# Patient Record
Sex: MALE | Race: WHITE | NOT HISPANIC OR LATINO | Employment: UNEMPLOYED | URBAN - NONMETROPOLITAN AREA
[De-identification: names, ages, dates, MRNs, and addresses within clinical notes are randomized per-mention and may not be internally consistent; named-entity substitution may affect disease eponyms.]

---
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
## 2022-04-14 DIAGNOSIS — F33.42 RECURRENT MAJOR DEPRESSIVE DISORDER, IN FULL REMISSION: Chronic | ICD-10-CM

## 2022-04-14 DIAGNOSIS — F41.1 GENERALIZED ANXIETY DISORDER: Chronic | ICD-10-CM

## 2022-04-14 RX ORDER — ESCITALOPRAM OXALATE 10 MG/1
10 TABLET ORAL DAILY
Qty: 30 TABLET | Refills: 11 | Status: SHIPPED | OUTPATIENT
Start: 2022-04-14

## 2022-04-20 ENCOUNTER — LAB (OUTPATIENT)
Dept: LAB | Facility: OTHER | Age: 22
End: 2022-04-20

## 2022-04-20 DIAGNOSIS — E78.2 MIXED HYPERLIPIDEMIA: Chronic | ICD-10-CM

## 2022-04-20 DIAGNOSIS — E11.9 TYPE 2 DIABETES MELLITUS WITHOUT COMPLICATION, WITHOUT LONG-TERM CURRENT USE OF INSULIN: Chronic | ICD-10-CM

## 2022-04-20 DIAGNOSIS — I10 ESSENTIAL HYPERTENSION: ICD-10-CM

## 2022-04-20 LAB
ALBUMIN SERPL-MCNC: 4.7 G/DL (ref 3.5–5)
ALBUMIN/GLOB SERPL: 1.5 G/DL (ref 1.1–1.8)
ALP SERPL-CCNC: 77 U/L (ref 38–126)
ALT SERPL W P-5'-P-CCNC: 70 U/L
ANION GAP SERPL CALCULATED.3IONS-SCNC: 9 MMOL/L (ref 5–15)
AST SERPL-CCNC: 47 U/L (ref 17–59)
BILIRUB SERPL-MCNC: 1 MG/DL (ref 0.2–1.3)
BILIRUB UR QL STRIP: ABNORMAL
BUN SERPL-MCNC: 8 MG/DL (ref 7–23)
BUN/CREAT SERPL: 12.5 (ref 7–25)
CALCIUM SPEC-SCNC: 9.8 MG/DL (ref 8.4–10.2)
CHLORIDE SERPL-SCNC: 102 MMOL/L (ref 101–112)
CHOLEST SERPL-MCNC: 177 MG/DL (ref 150–200)
CLARITY UR: CLEAR
CO2 SERPL-SCNC: 27 MMOL/L (ref 22–30)
COLOR UR: ABNORMAL
CREAT SERPL-MCNC: 0.64 MG/DL (ref 0.7–1.3)
DEPRECATED RDW RBC AUTO: 41.2 FL (ref 37–54)
EGFRCR SERPLBLD CKD-EPI 2021: 139 ML/MIN/1.73
EOSINOPHIL # BLD MANUAL: 0.4 10*3/MM3 (ref 0–0.4)
EOSINOPHIL NFR BLD MANUAL: 4 % (ref 0.3–6.2)
ERYTHROCYTE [DISTWIDTH] IN BLOOD BY AUTOMATED COUNT: 13.5 % (ref 12.3–15.4)
GLOBULIN UR ELPH-MCNC: 3.2 GM/DL (ref 2.3–3.5)
GLUCOSE SERPL-MCNC: 142 MG/DL (ref 70–99)
GLUCOSE UR STRIP-MCNC: NEGATIVE MG/DL
HBA1C MFR BLD: 6.7 % (ref 4.8–5.6)
HCT VFR BLD AUTO: 46.5 % (ref 37.5–51)
HDLC SERPL-MCNC: 32 MG/DL (ref 40–59)
HGB BLD-MCNC: 16.3 G/DL (ref 13–17.7)
HGB UR QL STRIP.AUTO: NEGATIVE
KETONES UR QL STRIP: ABNORMAL
LDLC SERPL CALC-MCNC: 99 MG/DL
LDLC/HDLC SERPL: 2.86 {RATIO} (ref 0–3.55)
LEUKOCYTE ESTERASE UR QL STRIP.AUTO: NEGATIVE
LYMPHOCYTES # BLD MANUAL: 2.1 10*3/MM3 (ref 0.7–3.1)
LYMPHOCYTES NFR BLD MANUAL: 12 % (ref 5–12)
MCH RBC QN AUTO: 29.6 PG (ref 26.6–33)
MCHC RBC AUTO-ENTMCNC: 35.1 G/DL (ref 31.5–35.7)
MCV RBC AUTO: 84.4 FL (ref 79–97)
MONOCYTES # BLD: 1.2 10*3/MM3 (ref 0.1–0.9)
NEUTROPHILS # BLD AUTO: 6.29 10*3/MM3 (ref 1.7–7)
NEUTROPHILS NFR BLD MANUAL: 63 % (ref 42.7–76)
NITRITE UR QL STRIP: NEGATIVE
PH UR STRIP.AUTO: 5.5 [PH] (ref 5.5–8)
PLATELET # BLD AUTO: 304 10*3/MM3 (ref 140–450)
PMV BLD AUTO: 10.7 FL (ref 6–12)
POTASSIUM SERPL-SCNC: 4 MMOL/L (ref 3.4–5)
PROT SERPL-MCNC: 7.9 G/DL (ref 6.3–8.6)
PROT UR QL STRIP: ABNORMAL
RBC # BLD AUTO: 5.51 10*6/MM3 (ref 4.14–5.8)
RBC MORPH BLD: NORMAL
SMALL PLATELETS BLD QL SMEAR: ADEQUATE
SODIUM SERPL-SCNC: 138 MMOL/L (ref 137–145)
SP GR UR STRIP: 1.02 (ref 1–1.03)
TRIGL SERPL-MCNC: 268 MG/DL
UROBILINOGEN UR QL STRIP: ABNORMAL
VARIANT LYMPHS NFR BLD MANUAL: 21 % (ref 19.6–45.3)
VLDLC SERPL-MCNC: 46 MG/DL (ref 5–40)
WBC MORPH BLD: NORMAL
WBC NRBC COR # BLD: 9.99 10*3/MM3 (ref 3.4–10.8)

## 2022-04-20 PROCEDURE — 80050 GENERAL HEALTH PANEL: CPT | Performed by: INTERNAL MEDICINE

## 2022-04-20 PROCEDURE — 85007 BL SMEAR W/DIFF WBC COUNT: CPT | Performed by: INTERNAL MEDICINE

## 2022-04-20 PROCEDURE — 80061 LIPID PANEL: CPT | Performed by: INTERNAL MEDICINE

## 2022-04-20 PROCEDURE — 84439 ASSAY OF FREE THYROXINE: CPT | Performed by: INTERNAL MEDICINE

## 2022-04-20 PROCEDURE — 81003 URINALYSIS AUTO W/O SCOPE: CPT | Performed by: INTERNAL MEDICINE

## 2022-04-20 PROCEDURE — 83036 HEMOGLOBIN GLYCOSYLATED A1C: CPT | Performed by: INTERNAL MEDICINE

## 2022-04-21 LAB
T4 FREE SERPL-MCNC: 1.5 NG/DL (ref 0.93–1.7)
TSH SERPL DL<=0.05 MIU/L-ACNC: 2.05 UIU/ML (ref 0.27–4.2)

## 2022-04-25 ENCOUNTER — OFFICE VISIT (OUTPATIENT)
Dept: FAMILY MEDICINE CLINIC | Facility: CLINIC | Age: 22
End: 2022-04-25

## 2022-04-25 VITALS
WEIGHT: 308 LBS | TEMPERATURE: 98.1 F | SYSTOLIC BLOOD PRESSURE: 118 MMHG | BODY MASS INDEX: 41.72 KG/M2 | HEIGHT: 72 IN | OXYGEN SATURATION: 97 % | DIASTOLIC BLOOD PRESSURE: 78 MMHG | HEART RATE: 87 BPM

## 2022-04-25 DIAGNOSIS — E11.9 TYPE 2 DIABETES MELLITUS WITHOUT COMPLICATION, WITHOUT LONG-TERM CURRENT USE OF INSULIN: Primary | Chronic | ICD-10-CM

## 2022-04-25 DIAGNOSIS — I10 ESSENTIAL HYPERTENSION: Chronic | ICD-10-CM

## 2022-04-25 DIAGNOSIS — K76.0 FATTY INFILTRATION OF LIVER: Chronic | ICD-10-CM

## 2022-04-25 DIAGNOSIS — E78.2 MIXED HYPERLIPIDEMIA: Chronic | ICD-10-CM

## 2022-04-25 DIAGNOSIS — F33.42 RECURRENT MAJOR DEPRESSIVE DISORDER, IN FULL REMISSION: Chronic | ICD-10-CM

## 2022-04-25 DIAGNOSIS — F41.1 GENERALIZED ANXIETY DISORDER: Chronic | ICD-10-CM

## 2022-04-25 DIAGNOSIS — E66.01 MORBID OBESITY: Chronic | ICD-10-CM

## 2022-04-25 PROCEDURE — 99214 OFFICE O/P EST MOD 30 MIN: CPT | Performed by: INTERNAL MEDICINE

## 2022-04-25 RX ORDER — METFORMIN HYDROCHLORIDE 500 MG/1
1000 TABLET, EXTENDED RELEASE ORAL
Qty: 60 TABLET | Refills: 11 | Status: SHIPPED | OUTPATIENT
Start: 2022-04-25

## 2022-04-25 RX ORDER — LISINOPRIL AND HYDROCHLOROTHIAZIDE 20; 12.5 MG/1; MG/1
1 TABLET ORAL 2 TIMES DAILY
Qty: 60 TABLET | Refills: 11 | Status: SHIPPED | OUTPATIENT
Start: 2022-04-25

## 2022-04-25 RX ORDER — ROSUVASTATIN CALCIUM 10 MG/1
10 TABLET, COATED ORAL NIGHTLY
Qty: 30 TABLET | Refills: 11 | Status: SHIPPED | OUTPATIENT
Start: 2022-04-25

## 2022-04-25 NOTE — PROGRESS NOTES
Chief Complaint   Patient presents with   • Hypertension   • Type 2 diabetes mellitus without complication, without long   • Fatty infiltration of liver   • Anxiety   • Depression     Subjective   Forest David is a 22 y.o. male who presents to the office for for follow-up and review of labs.  He has diabetes and takes metformin ER 1000 mg nightly with supper.  He has been tolerating this without any side effects.  He has been compliant with a diabetic diet.  He has hyperlipidemia and takes Crestor 10 mg nightly.  He has hypertension, and his blood pressure has been controlled.  He takes lisinopril HCT 20/12.5 mg twice daily.  He takes Lexapro 10 mg daily for treatment of anxiety and depression.  His depression is in full remission.  He has a diagnosis of fatty infiltration of the liver.  His liver enzymes have been slightly elevated but stable.  He started a new job a couple of weeks ago.  He is now more active and is getting more exercise due to the job.    History of Present Illness has been reviewed and validated on 04/25/2022 and updated with any changes.    The following portions of the patient's history were reviewed and updated as appropriate: allergies, current medications, past family history, past medical history, past social history, past surgical history and problem list.    Review of Systems   Constitutional: Negative for chills, fatigue and fever.   HENT: Negative for congestion, sneezing, sore throat and trouble swallowing.    Eyes: Negative for visual disturbance.   Respiratory: Negative for cough, chest tightness, shortness of breath and wheezing.    Cardiovascular: Negative for chest pain, palpitations and leg swelling.   Gastrointestinal: Negative for abdominal pain, constipation, diarrhea, nausea and vomiting.   Genitourinary: Negative for dysuria, frequency and urgency.   Musculoskeletal: Positive for back pain. Negative for neck pain.   Skin: Negative for rash.   Neurological: Negative for  "dizziness and weakness.   Psychiatric/Behavioral:        Patient denies any feelings of depression and has not felt down, hopeless or lost interest in any activities.   All other systems reviewed and are negative.  Review of systems has been reviewed and validated on 04/25/2022 and updated with any changes.      Objective   Vitals:    04/25/22 1539   BP: 118/78   BP Location: Left arm   Patient Position: Sitting   Cuff Size: Large Adult   Pulse: 87  Comment: regular   Temp: 98.1 °F (36.7 °C)   TempSrc: Temporal   SpO2: 97%   Weight: (!) 140 kg (308 lb)   Height: 182.9 cm (72\")   PainSc: 0-No pain     Body mass index is 41.77 kg/m².  Physical Exam  Vitals and nursing note reviewed.   Constitutional:       General: He is not in acute distress.     Appearance: He is well-developed.   HENT:      Head: Normocephalic and atraumatic.      Nose: Nose normal.   Eyes:      General: No scleral icterus.     Conjunctiva/sclera: Conjunctivae normal.      Pupils: Pupils are equal, round, and reactive to light.   Cardiovascular:      Rate and Rhythm: Normal rate and regular rhythm.      Heart sounds: Normal heart sounds. No murmur heard.    No friction rub. No gallop.   Pulmonary:      Effort: Pulmonary effort is normal. No respiratory distress.      Breath sounds: Normal breath sounds. No wheezing or rales.   Musculoskeletal:         General: Normal range of motion.      Cervical back: Normal range of motion and neck supple.   Lymphadenopathy:      Cervical: No cervical adenopathy.   Skin:     General: Skin is warm and dry.      Findings: No rash.   Neurological:      Mental Status: He is alert and oriented to person, place, and time.      Cranial Nerves: No cranial nerve deficit.   Psychiatric:         Behavior: Behavior normal.         Thought Content: Thought content normal.         Judgment: Judgment normal.     Physical exam has been reviewed and validated on 04/25/2022 and updated with any changes.    Assessment/Plan "   Diagnoses and all orders for this visit:    1. Type 2 diabetes mellitus without complication, without long-term current use of insulin ([facility]) (Primary)  -     Hemoglobin A1c; Future  -     Microalbumin / Creatinine Urine Ratio - Urine, Clean Catch; Future  -     metFORMIN ER (GLUCOPHAGE-XR) 500 MG 24 hr tablet; Take 2 tablets by mouth Daily Before Supper.  Dispense: 60 tablet; Refill: 11    2. Essential hypertension  -     CBC & Differential; Future  -     Comprehensive Metabolic Panel; Future  -     T4, Free; Future  -     TSH; Future  -     Urinalysis With Culture If Indicated -; Future  -     lisinopril-hydrochlorothiazide (PRINZIDE,ZESTORETIC) 20-12.5 MG per tablet; Take 1 tablet by mouth 2 (Two) Times a Day.  Dispense: 60 tablet; Refill: 11    3. Mixed hyperlipidemia  -     Lipid Panel; Future  -     rosuvastatin (CRESTOR) 10 MG tablet; Take 1 tablet by mouth Every Night.  Dispense: 30 tablet; Refill: 11    4. Fatty infiltration of liver    5. Recurrent major depressive disorder, in full remission ([facility])    6. Generalized anxiety disorder    7. Morbid obesity (HCC)         Labs are reviewed with patient.  His glucose is 142 with a hemoglobin A1c of 6.70.  His diabetes is controlled.  He will continue with metformin ER 1000 mg daily with his evening meal.  He may monitor blood sugar 1 time daily. I discussed dietary restrictions to help with control of his diabetes.  His ALT is slightly elevated at 70.  His AST is normal at 47.  His liver enzymes have improved compared to the previous visit.  His total cholesterol is 177, LDL 99 and triglycerides 268.  His HDL is 32.  He will continue with Crestor 10 mg daily for treatment of hyperlipidemia.  He will continue with Lexapro 10 mg daily for treatment of anxiety and depression.  His blood pressure is controlled, and he will continue with lisinopril HCT 20/12.5 mg twice daily.     Patient's Body mass index is 41.77 kg/m². indicating that he is morbidly obese (BMI >  40 or > 35 with obesity - related health condition). Obesity-related health conditions include the following: hypertension, diabetes mellitus and dyslipidemias. Obesity is unchanged. BMI is is above average; BMI management plan is completed. We discussed portion control and increasing exercise..    PHQ-2/PHQ-9 Depression Screening 4/25/2022   Retired PHQ-9 Total Score -   Retired Total Score -   Little Interest or Pleasure in Doing Things 0-->not at all   Feeling Down, Depressed or Hopeless 0-->not at all   PHQ-9: Brief Depression Severity Measure Score 0         Lab on 04/20/2022   Component Date Value Ref Range Status   • Glucose 04/20/2022 142 (A) 70 - 99 mg/dL Final   • BUN 04/20/2022 8  7 - 23 mg/dL Final   • Creatinine 04/20/2022 0.64 (A) 0.70 - 1.30 mg/dL Final   • Sodium 04/20/2022 138  137 - 145 mmol/L Final   • Potassium 04/20/2022 4.0  3.4 - 5.0 mmol/L Final   • Chloride 04/20/2022 102  101 - 112 mmol/L Final   • CO2 04/20/2022 27.0  22.0 - 30.0 mmol/L Final   • Calcium 04/20/2022 9.8  8.4 - 10.2 mg/dL Final   • Total Protein 04/20/2022 7.9  6.3 - 8.6 g/dL Final   • Albumin 04/20/2022 4.70  3.50 - 5.00 g/dL Final   • ALT (SGPT) 04/20/2022 70 (A) <=50 U/L Final   • AST (SGOT) 04/20/2022 47  17 - 59 U/L Final   • Alkaline Phosphatase 04/20/2022 77  38 - 126 U/L Final   • Total Bilirubin 04/20/2022 1.0  0.2 - 1.3 mg/dL Final   • Globulin 04/20/2022 3.2  2.3 - 3.5 gm/dL Final   • A/G Ratio 04/20/2022 1.5  1.1 - 1.8 g/dL Final   • BUN/Creatinine Ratio 04/20/2022 12.5  7.0 - 25.0 Final   • Anion Gap 04/20/2022 9.0  5.0 - 15.0 mmol/L Final   • eGFR 04/20/2022 139.0  >60.0 mL/min/1.73 Final    National Kidney Foundation and American Society of Nephrology (ASN) Task Force recommended calculation based on the Chronic Kidney Disease Epidemiology Collaboration (CKD-EPI) equation refit without adjustment for race.   • Free T4 04/20/2022 1.50  0.93 - 1.70 ng/dL Final   • TSH 04/20/2022 2.050  0.270 - 4.200 uIU/mL Final    • Color, UA 04/20/2022 Dark Yellow (A) Yellow, Straw Final   • Appearance, UA 04/20/2022 Clear  Clear Final   • pH, UA 04/20/2022 5.5  5.5 - 8.0 Final   • Specific Gravity, UA 04/20/2022 1.025  1.005 - 1.030 Final   • Glucose, UA 04/20/2022 Negative  Negative Final   • Ketones, UA 04/20/2022 Trace (A) Negative Final   • Bilirubin, UA 04/20/2022 Small (1+) (A) Negative Final   • Blood, UA 04/20/2022 Negative  Negative Final   • Protein, UA 04/20/2022 Trace (A) Negative Final   • Leuk Esterase, UA 04/20/2022 Negative  Negative Final   • Nitrite, UA 04/20/2022 Negative  Negative Final   • Urobilinogen, UA 04/20/2022 0.2 E.U./dL  0.2 - 1.0 E.U./dL Final   • Hemoglobin A1C 04/20/2022 6.70 (A) 4.80 - 5.60 % Final   • Total Cholesterol 04/20/2022 177  150 - 200 mg/dL Final   • Triglycerides 04/20/2022 268 (A) <=150 mg/dL Final   • HDL Cholesterol 04/20/2022 32 (A) 40 - 59 mg/dL Final   • LDL Cholesterol  04/20/2022 99  <=100 mg/dL Final   • VLDL Cholesterol 04/20/2022 46 (A) 5 - 40 mg/dL Final   • LDL/HDL Ratio 04/20/2022 2.86  0.00 - 3.55 Final   • WBC 04/20/2022 9.99  3.40 - 10.80 10*3/mm3 Final   • RBC 04/20/2022 5.51  4.14 - 5.80 10*6/mm3 Final   • Hemoglobin 04/20/2022 16.3  13.0 - 17.7 g/dL Final   • Hematocrit 04/20/2022 46.5  37.5 - 51.0 % Final   • MCV 04/20/2022 84.4  79.0 - 97.0 fL Final   • MCH 04/20/2022 29.6  26.6 - 33.0 pg Final   • MCHC 04/20/2022 35.1  31.5 - 35.7 g/dL Final   • RDW 04/20/2022 13.5  12.3 - 15.4 % Final   • RDW-SD 04/20/2022 41.2  37.0 - 54.0 fl Final   • MPV 04/20/2022 10.7  6.0 - 12.0 fL Final   • Platelets 04/20/2022 304  140 - 450 10*3/mm3 Final   • Neutrophil % 04/20/2022 63.0  42.7 - 76.0 % Final   • Lymphocyte % 04/20/2022 21.0  19.6 - 45.3 % Final   • Monocyte % 04/20/2022 12.0  5.0 - 12.0 % Final   • Eosinophil % 04/20/2022 4.0  0.3 - 6.2 % Final   • Neutrophils Absolute 04/20/2022 6.29  1.70 - 7.00 10*3/mm3 Final   • Lymphocytes Absolute 04/20/2022 2.10  0.70 - 3.10 10*3/mm3  Final   • Monocytes Absolute 04/20/2022 1.20 (A) 0.10 - 0.90 10*3/mm3 Final   • Eosinophils Absolute 04/20/2022 0.40  0.00 - 0.40 10*3/mm3 Final   • RBC Morphology 04/20/2022 Normal  Normal Final   • WBC Morphology 04/20/2022 Normal  Normal Final   • Platelet Estimate 04/20/2022 Adequate  Normal Final   ]

## 2022-08-26 DIAGNOSIS — E11.9 TYPE 2 DIABETES MELLITUS WITHOUT COMPLICATION, WITHOUT LONG-TERM CURRENT USE OF INSULIN: Primary | ICD-10-CM

## 2022-08-26 DIAGNOSIS — I10 ESSENTIAL HYPERTENSION: ICD-10-CM

## 2022-08-26 DIAGNOSIS — E78.2 MIXED HYPERLIPIDEMIA: ICD-10-CM

## 2022-10-25 ENCOUNTER — LAB (OUTPATIENT)
Dept: LAB | Facility: OTHER | Age: 22
End: 2022-10-25

## 2022-10-25 DIAGNOSIS — E78.2 MIXED HYPERLIPIDEMIA: ICD-10-CM

## 2022-10-25 DIAGNOSIS — I10 ESSENTIAL HYPERTENSION: ICD-10-CM

## 2022-10-25 DIAGNOSIS — E11.9 TYPE 2 DIABETES MELLITUS WITHOUT COMPLICATION, WITHOUT LONG-TERM CURRENT USE OF INSULIN: ICD-10-CM

## 2022-10-25 LAB
ALBUMIN SERPL-MCNC: 4.7 G/DL (ref 3.5–5)
ALBUMIN/GLOB SERPL: 1.6 G/DL (ref 1.1–1.8)
ALP SERPL-CCNC: 84 U/L (ref 38–126)
ALT SERPL W P-5'-P-CCNC: 72 U/L
ANION GAP SERPL CALCULATED.3IONS-SCNC: 11 MMOL/L (ref 5–15)
ANISOCYTOSIS BLD QL: NORMAL
AST SERPL-CCNC: 41 U/L (ref 17–59)
BILIRUB SERPL-MCNC: 0.7 MG/DL (ref 0.2–1.3)
BILIRUB UR QL STRIP: NEGATIVE
BUN SERPL-MCNC: 7 MG/DL (ref 7–23)
BUN/CREAT SERPL: 10.6 (ref 7–25)
CALCIUM SPEC-SCNC: 9.4 MG/DL (ref 8.4–10.2)
CHLORIDE SERPL-SCNC: 103 MMOL/L (ref 101–112)
CHOLEST SERPL-MCNC: 172 MG/DL (ref 150–200)
CLARITY UR: CLEAR
CO2 SERPL-SCNC: 23 MMOL/L (ref 22–30)
COLOR UR: YELLOW
CREAT SERPL-MCNC: 0.66 MG/DL (ref 0.7–1.3)
DEPRECATED RDW RBC AUTO: 40.3 FL (ref 37–54)
EGFRCR SERPLBLD CKD-EPI 2021: 137.7 ML/MIN/1.73
EOSINOPHIL # BLD MANUAL: 0.19 10*3/MM3 (ref 0–0.4)
EOSINOPHIL NFR BLD MANUAL: 2 % (ref 0.3–6.2)
ERYTHROCYTE [DISTWIDTH] IN BLOOD BY AUTOMATED COUNT: 13.2 % (ref 12.3–15.4)
GLOBULIN UR ELPH-MCNC: 2.9 GM/DL (ref 2.3–3.5)
GLUCOSE SERPL-MCNC: 123 MG/DL (ref 70–99)
GLUCOSE UR STRIP-MCNC: NEGATIVE MG/DL
HCT VFR BLD AUTO: 48 % (ref 37.5–51)
HDLC SERPL-MCNC: 31 MG/DL (ref 40–59)
HGB BLD-MCNC: 16.4 G/DL (ref 13–17.7)
HGB UR QL STRIP.AUTO: NEGATIVE
KETONES UR QL STRIP: NEGATIVE
LDLC SERPL CALC-MCNC: 68 MG/DL
LDLC/HDLC SERPL: 1.5 {RATIO} (ref 0–3.55)
LEUKOCYTE ESTERASE UR QL STRIP.AUTO: NEGATIVE
LYMPHOCYTES # BLD MANUAL: 1.87 10*3/MM3 (ref 0.7–3.1)
LYMPHOCYTES NFR BLD MANUAL: 9 % (ref 5–12)
MCH RBC QN AUTO: 28.5 PG (ref 26.6–33)
MCHC RBC AUTO-ENTMCNC: 34.2 G/DL (ref 31.5–35.7)
MCV RBC AUTO: 83.3 FL (ref 79–97)
MONOCYTES # BLD: 0.84 10*3/MM3 (ref 0.1–0.9)
NEUTROPHILS # BLD AUTO: 6.44 10*3/MM3 (ref 1.7–7)
NEUTROPHILS NFR BLD MANUAL: 69 % (ref 42.7–76)
NITRITE UR QL STRIP: NEGATIVE
PH UR STRIP.AUTO: 6.5 [PH] (ref 5.5–8)
PLATELET # BLD AUTO: 285 10*3/MM3 (ref 140–450)
PMV BLD AUTO: 10.6 FL (ref 6–12)
POTASSIUM SERPL-SCNC: 4.3 MMOL/L (ref 3.4–5)
PROT SERPL-MCNC: 7.6 G/DL (ref 6.3–8.6)
PROT UR QL STRIP: NEGATIVE
RBC # BLD AUTO: 5.76 10*6/MM3 (ref 4.14–5.8)
SMALL PLATELETS BLD QL SMEAR: ADEQUATE
SODIUM SERPL-SCNC: 137 MMOL/L (ref 137–145)
SP GR UR STRIP: 1.02 (ref 1–1.03)
TRIGL SERPL-MCNC: 472 MG/DL
UROBILINOGEN UR QL STRIP: NORMAL
VARIANT LYMPHS NFR BLD MANUAL: 20 % (ref 19.6–45.3)
VLDLC SERPL-MCNC: 73 MG/DL (ref 5–40)
WBC MORPH BLD: NORMAL
WBC NRBC COR # BLD: 9.34 10*3/MM3 (ref 3.4–10.8)

## 2022-10-25 PROCEDURE — 82570 ASSAY OF URINE CREATININE: CPT | Performed by: INTERNAL MEDICINE

## 2022-10-25 PROCEDURE — 83036 HEMOGLOBIN GLYCOSYLATED A1C: CPT | Performed by: INTERNAL MEDICINE

## 2022-10-25 PROCEDURE — 80050 GENERAL HEALTH PANEL: CPT | Performed by: INTERNAL MEDICINE

## 2022-10-25 PROCEDURE — 82043 UR ALBUMIN QUANTITATIVE: CPT | Performed by: INTERNAL MEDICINE

## 2022-10-25 PROCEDURE — 81003 URINALYSIS AUTO W/O SCOPE: CPT | Performed by: INTERNAL MEDICINE

## 2022-10-25 PROCEDURE — 80061 LIPID PANEL: CPT | Performed by: INTERNAL MEDICINE

## 2022-10-25 PROCEDURE — 84439 ASSAY OF FREE THYROXINE: CPT | Performed by: INTERNAL MEDICINE

## 2022-10-25 PROCEDURE — 85007 BL SMEAR W/DIFF WBC COUNT: CPT | Performed by: INTERNAL MEDICINE

## 2022-10-26 LAB
ALBUMIN UR-MCNC: 1.7 MG/DL
CREAT UR-MCNC: 140.9 MG/DL
HBA1C MFR BLD: 6.7 % (ref 4.8–5.6)
MICROALBUMIN/CREAT UR: 12.1 MG/G
T4 FREE SERPL-MCNC: 1.21 NG/DL (ref 0.93–1.7)
TSH SERPL DL<=0.05 MIU/L-ACNC: 1.83 UIU/ML (ref 0.27–4.2)

## 2022-10-28 ENCOUNTER — OFFICE VISIT (OUTPATIENT)
Dept: FAMILY MEDICINE CLINIC | Facility: CLINIC | Age: 22
End: 2022-10-28

## 2022-10-28 VITALS
HEIGHT: 72 IN | BODY MASS INDEX: 41.99 KG/M2 | WEIGHT: 310 LBS | TEMPERATURE: 97.4 F | HEART RATE: 84 BPM | SYSTOLIC BLOOD PRESSURE: 138 MMHG | DIASTOLIC BLOOD PRESSURE: 80 MMHG

## 2022-10-28 DIAGNOSIS — F33.42 RECURRENT MAJOR DEPRESSIVE DISORDER, IN FULL REMISSION: Chronic | ICD-10-CM

## 2022-10-28 DIAGNOSIS — E11.9 TYPE 2 DIABETES MELLITUS WITHOUT COMPLICATION, WITHOUT LONG-TERM CURRENT USE OF INSULIN: Primary | Chronic | ICD-10-CM

## 2022-10-28 DIAGNOSIS — I10 ESSENTIAL HYPERTENSION: Chronic | ICD-10-CM

## 2022-10-28 DIAGNOSIS — K76.0 FATTY INFILTRATION OF LIVER: Chronic | ICD-10-CM

## 2022-10-28 DIAGNOSIS — E78.2 MIXED HYPERLIPIDEMIA: Chronic | ICD-10-CM

## 2022-10-28 DIAGNOSIS — E78.1 HYPERTRIGLYCERIDEMIA: Chronic | ICD-10-CM

## 2022-10-28 DIAGNOSIS — E66.01 MORBID OBESITY: Chronic | ICD-10-CM

## 2022-10-28 PROCEDURE — 99214 OFFICE O/P EST MOD 30 MIN: CPT | Performed by: INTERNAL MEDICINE

## 2022-10-28 NOTE — PROGRESS NOTES
"Chief Complaint  Follow-up (6 month) and Establish Care    Subjective        History of Present Illness     Forest David is a 22-year-old obese male who presents to the office to establish care in our practice.  His grandfather is Ambrosio David, also a patient of ours.  He has been a former patient of Dr. Anthony Boateng.  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  His grandfather describes a rather sedentary lifestyle when he is not doing something on the farm.    His chronic medical issues include Type 2 diabetes diagnosed at age 21, for which he takes metformin ER 1000 mg nightly with supper, hyperlipidemia addressed with Crestor 10 mg nightly, and hypertension managed with lisinopril HCT 20/12.5 mg twice daily. He takes Lexapro 10 mg daily for CLEMENTINE/depression.   He has a diagnosis of fatty infiltration of the liver documented on liver ultrasound in 2016.  His liver enzymes have been slightly elevated but stable.      Diabetes management is stable, but not at goal.  As above, he was started on metformin with his diagnosis of Type 2 diabetes 05/2021.  Weight is stable at 310 pounds with BMI 42.  He states he has not been able to lose any substantial weight despite starting metformin.  He continues to drink sugar sweetened tea and eat foods high in carbohydrates such as bread.  He admits he is not meeting exercise goals and is quite sedentary when not working on the farm      The patient's relevant past medical, surgical, and social history was reviewed in Epic.   Lab results are reviewed with the patient today. CBC unremarkable  Fasting glucose 123.  A1c stable at 6.7. Normal renal function.  Cholesterol at goal with Crestor, although, triglycerides remain far above goal at 472 and HDL cholesterol remains low at 31.  Normal thyroid screen.           Objective   Vital Signs:  /80   Pulse 84   Temp 97.4 °F (36.3 °C)   Ht 182.9 cm (72\")   Wt (!) 141 kg (310 lb)   BMI 42.04 kg/m² " "  Estimated body mass index is 42.04 kg/m² as calculated from the following:    Height as of this encounter: 182.9 cm (72\").    Weight as of this encounter: 141 kg (310 lb).          Physical Exam  Vitals reviewed.   Constitutional:       General: He is not in acute distress.     Appearance: He is well-developed.      Comments: Pleasant male, obese.    HENT:      Head: Normocephalic and atraumatic.      Nose:      Right Sinus: No maxillary sinus tenderness or frontal sinus tenderness.      Left Sinus: No maxillary sinus tenderness or frontal sinus tenderness.      Mouth/Throat:      Mouth: No oral lesions.      Pharynx: Uvula midline.      Tonsils: No tonsillar exudate.   Eyes:      Conjunctiva/sclera: Conjunctivae normal.      Pupils: Pupils are equal, round, and reactive to light.   Neck:      Thyroid: No thyroid mass or thyromegaly.      Vascular: No carotid bruit or JVD.      Trachea: Trachea normal. No tracheal deviation.   Cardiovascular:      Rate and Rhythm: Normal rate and regular rhythm.  No extrasystoles are present.     Chest Wall: PMI is not displaced.      Heart sounds: Normal heart sounds. No murmur heard.  Pulmonary:      Effort: Pulmonary effort is normal. No accessory muscle usage or respiratory distress.      Breath sounds: Normal breath sounds. No decreased breath sounds, wheezing, rhonchi or rales.   Abdominal:      General: Bowel sounds are normal. There is no distension.      Palpations: Abdomen is soft.      Tenderness: There is no abdominal tenderness.      Comments: Obese abdomen limits exam.    Musculoskeletal:      Cervical back: Neck supple.   Lymphadenopathy:      Cervical: No cervical adenopathy.   Skin:     General: Skin is warm and dry.      Findings: No rash.      Nails: There is no clubbing.   Neurological:      Mental Status: He is alert and oriented to person, place, and time.      Cranial Nerves: No cranial nerve deficit.      Coordination: Coordination normal.   Psychiatric:    "      Speech: Speech normal.         Behavior: Behavior normal.         Thought Content: Thought content normal.         Judgment: Judgment normal.            Result Review :    CMP    CMP 4/20/22 10/25/22   Glucose 142 (A) 123 (A)   BUN 8 7   Creatinine 0.64 (A) 0.66 (A)   Sodium 138 137   Potassium 4.0 4.3   Chloride 102 103   Calcium 9.8 9.4   Albumin 4.70 4.70   Total Bilirubin 1.0 0.7   Alkaline Phosphatase 77 84   AST (SGOT) 47 41   ALT (SGPT) 70 (A) 72 (A)   (A) Abnormal value            CBC w/diff    CBC w/Diff 4/20/22 10/25/22   WBC 9.99 9.34   RBC 5.51 5.76   Hemoglobin 16.3 16.4   Hematocrit 46.5 48.0   MCV 84.4 83.3   MCH 29.6 28.5   MCHC 35.1 34.2   RDW 13.5 13.2   Platelets 304 285           Lipid Panel    Lipid Panel 4/20/22 10/25/22   Total Cholesterol 177 172   Triglycerides 268 (A) 472 (A)   HDL Cholesterol 32 (A) 31 (A)   VLDL Cholesterol 46 (A) 73 (A)   LDL Cholesterol  99 68   LDL/HDL Ratio 2.86 1.50   (A) Abnormal value            TSH    TSH 4/20/22 10/25/22   TSH 2.050 1.830           A1C Last 3 Results    HGBA1C Last 3 Results 4/20/22 10/25/22   Hemoglobin A1C 6.70 (A) 6.70 (A)   (A) Abnormal value                          Assessment and Plan   Diagnoses and all orders for this visit:    1. Type 2 diabetes mellitus without complication, without long-term current use of insulin (HCC) (Primary)    2. Mixed hyperlipidemia    3. Essential hypertension    4. Fatty infiltration of liver    5. Morbid obesity (HCC)    6. Recurrent major depressive disorder, in full remission (HCC)    7. Hypertriglyceridemia                To help improve diabetic management and give appetite suppression to help achieve weight loss, a sample of Mounjaro 2.5 mg is given today with his first injection given while in the office today with instructions to administer 2.5 mg injection weekly x 3 additional weeks with a prescription for Mounjaro 5 mg to take weekly thereafter until weight loss plateaus, at which time we will  further increase the dose.  Continue metformin ER 1000 mg nightly with supper meal.  Reviewed principles of diabetic diet.  Recommended patient eliminate all sugar sweetened drinks including tea as well as carbohydrates such as breads, etc.  Reduce portion sizes.  Encouraged patient to incorporate regular exercise into his routine in addition to working on the farm.  Weigh weekly to track progress with a goal of losing 1-2 pounds weekly.  Weight loss will also help lower liver enzymes.  We discussed the seriousness of complications of fatty liver including possible liver failure.         Continue the Lexapro for CLEMENTINE/depression.  .     Continue Crestor 10 mg q.d. LDL cholesterol at goal, but triglycerides very high due to high carbohydrate intake.      Return in 1 month for follow up on weight loss efforts.      [facility] Dr[facility] Gibbons [facility]     Follow Up   Return in about 1 month (around 11/28/2022) for Next scheduled follow up.  Patient was given instructions and counseling regarding his condition or for health maintenance advice. Please see specific information pulled into the AVS if appropriate.

## 2023-01-04 NOTE — PROGRESS NOTES
Chief Complaint  Follow-up (1 month f/u weight)    Subjective        History of Present Illness     Forest established care in our practice in October 2022 and comes in today to follow up on diabetic management and weight loss efforts.  His grandfather is Ambrosio David, also a patient of ours.  He has been a former patient of Dr. Anthony Boateng.  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     Patient was started on metformin with his diagnosis of Type 2 diabetes 05/2021, although, reported he had been unable to lose any significant weight.  We provided a sample of Mounjaro 2.5 mg with a prescription for Mounjaro 5 mg to take weekly.  Forest reports that he tolerated the samples well without any GI side effects and did notice significant appetite suppression, but he never went to the pharmacy to fill the Mounjaro 5 mg weekly prescription, for reasons I do not understand.  I reviewed the treatment plan again today and sent a fresh prescription for Mounjaro to his local pharmacy.  He agrees to go by there today to get started on the Mounjaro.    Is blood pressure control is marginal today.  His weight is actually up 10 pounds in the past month due to holiday eating.  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  We reviewed diabetic diet, exercise, and weight loss goals.      Objective   Vital Signs:  /88   Pulse 98   Temp 98.3 °F (36.8 °C)   Ht 182.9 cm (72\")   Wt (!) 141 kg (310 lb)   SpO2 99%   BMI 42.04 kg/m²   Estimated body mass index is 42.04 kg/m² as calculated from the following:    Height as of this encounter: 182.9 cm (72\").    Weight as of this encounter: 141 kg (310 lb).          Physical Exam  Vitals reviewed.   Constitutional:       General: He is not in acute distress.     Appearance: He is well-developed.      Comments: Pleasant, obese   HENT:      Head: Normocephalic and atraumatic.      Nose:      Right Sinus: No maxillary  sinus tenderness or frontal sinus tenderness.      Left Sinus: No maxillary sinus tenderness or frontal sinus tenderness.      Mouth/Throat:      Mouth: No oral lesions.      Pharynx: Uvula midline.      Tonsils: No tonsillar exudate.   Eyes:      Conjunctiva/sclera: Conjunctivae normal.      Pupils: Pupils are equal, round, and reactive to light.   Neck:      Thyroid: No thyroid mass or thyromegaly.      Vascular: No carotid bruit or JVD.      Trachea: Trachea normal. No tracheal deviation.   Cardiovascular:      Rate and Rhythm: Normal rate and regular rhythm.  No extrasystoles are present.     Chest Wall: PMI is not displaced.      Heart sounds: Normal heart sounds. No murmur heard.  Pulmonary:      Effort: Pulmonary effort is normal. No accessory muscle usage or respiratory distress.      Breath sounds: Normal breath sounds. No decreased breath sounds, wheezing, rhonchi or rales.   Abdominal:      General: Bowel sounds are normal. There is no distension.      Palpations: Abdomen is soft.      Tenderness: There is no abdominal tenderness.      Comments: Obese abdomen   Musculoskeletal:      Cervical back: Neck supple.   Lymphadenopathy:      Cervical: No cervical adenopathy.   Skin:     General: Skin is warm and dry.      Findings: No rash.      Nails: There is no clubbing.   Neurological:      General: No focal deficit present.      Mental Status: He is alert and oriented to person, place, and time.      Cranial Nerves: No cranial nerve deficit.      Coordination: Coordination normal.   Psychiatric:         Mood and Affect: Mood normal.         Speech: Speech normal.         Behavior: Behavior normal.         Thought Content: Thought content normal.         Judgment: Judgment normal.            Result Review :    A1C Last 3 Results    HGBA1C Last 3 Results 4/20/22 10/25/22   Hemoglobin A1C 6.70 (A) 6.70 (A)   (A) Abnormal value                      Assessment and Plan   Diagnoses and all orders for this  visit:    1. Type 2 diabetes mellitus without complication, without long-term current use of insulin (HCC) (Primary)  -     Comprehensive Metabolic Panel; Future  -     Hemoglobin A1c; Future  -     Lipid Panel; Future  -     Vitamin B12; Future    2. Essential hypertension  -     Comprehensive Metabolic Panel; Future    3. Morbid obesity (HCC)  -     Comprehensive Metabolic Panel; Future    4. Need for influenza vaccination    5. Hypertriglyceridemia  -     Lipid Panel; Future    6. Mixed hyperlipidemia  -     Lipid Panel; Future    Other orders  -     Tirzepatide (Mounjaro) 5 MG/0.5ML solution pen-injector; Inject 0.5 mL under the skin into the appropriate area as directed 1 (One) Time Per Week.  Dispense: 2 mL; Refill: 6  -     FluLaval/Fluarix/Fluzone >6 Months    I reviewed the diabetes treatment plan with use of metformin ER and Mounjaro injections 5 mg weekly.  I have sent a fresh prescription for Mounjaro to [facility] pharmacy and asked him to go by there today to pick it up and/or initiate a preauthorization if needed.  Greatly intensify efforts at aggressive diabetic diet, exercise, weight loss plan which I have reviewed again today.  Weight loss goal of 1 to 2 pounds weekly.  Weekly weights.    Continue current hypertension management with lisinopril HCT.  Pursue sodium restriction and aggressive weight loss.  Blood pressure control is marginal today.    Continue Crestor for hyperlipidemia and rather severe high triglycerides.  Aggressive weight loss should dramatically improve triglycerides.  If this is not accomplished, Vascepa would be my drug of choice.    He desires influenza vaccination today which he received without difficulty or complication.    Return to clinic in May with fasting labs 1 week prior.  Return sooner if any problems.                Follow Up   Return in about 4 months (around 5/5/2023) for Next scheduled follow up - labs 1 week prior.  Patient was given instructions  and counseling regarding his condition or for health maintenance advice. Please see specific information pulled into the AVS if appropriate.

## 2023-01-05 ENCOUNTER — OFFICE VISIT (OUTPATIENT)
Dept: FAMILY MEDICINE CLINIC | Facility: CLINIC | Age: 23
End: 2023-01-05
Payer: COMMERCIAL

## 2023-01-05 VITALS
DIASTOLIC BLOOD PRESSURE: 88 MMHG | WEIGHT: 310 LBS | OXYGEN SATURATION: 99 % | TEMPERATURE: 98.3 F | BODY MASS INDEX: 41.99 KG/M2 | HEIGHT: 72 IN | HEART RATE: 98 BPM | SYSTOLIC BLOOD PRESSURE: 138 MMHG

## 2023-01-05 DIAGNOSIS — E11.9 TYPE 2 DIABETES MELLITUS WITHOUT COMPLICATION, WITHOUT LONG-TERM CURRENT USE OF INSULIN: Primary | Chronic | ICD-10-CM

## 2023-01-05 DIAGNOSIS — E78.2 MIXED HYPERLIPIDEMIA: Chronic | ICD-10-CM

## 2023-01-05 DIAGNOSIS — Z23 NEED FOR INFLUENZA VACCINATION: ICD-10-CM

## 2023-01-05 DIAGNOSIS — E66.01 MORBID OBESITY: Chronic | ICD-10-CM

## 2023-01-05 DIAGNOSIS — E78.1 HYPERTRIGLYCERIDEMIA: Chronic | ICD-10-CM

## 2023-01-05 DIAGNOSIS — I10 ESSENTIAL HYPERTENSION: Chronic | ICD-10-CM

## 2023-01-05 PROCEDURE — 90471 IMMUNIZATION ADMIN: CPT | Performed by: INTERNAL MEDICINE

## 2023-01-05 PROCEDURE — 99214 OFFICE O/P EST MOD 30 MIN: CPT | Performed by: INTERNAL MEDICINE

## 2023-01-05 PROCEDURE — 90686 IIV4 VACC NO PRSV 0.5 ML IM: CPT | Performed by: INTERNAL MEDICINE

## 2023-01-05 RX ORDER — TIRZEPATIDE 5 MG/.5ML
5 INJECTION, SOLUTION SUBCUTANEOUS WEEKLY
Qty: 2 ML | Refills: 6 | Status: SHIPPED | OUTPATIENT
Start: 2023-01-05 | End: 2023-01-17 | Stop reason: CLARIF

## 2023-01-05 NOTE — PATIENT INSTRUCTIONS
Calorie Counting for Weight Loss  Calories are units of energy. Your body needs a certain number of calories from food to keep going throughout the day. When you eat or drink more calories than your body needs, your body stores the extra calories mostly as fat. When you eat or drink fewer calories than your body needs, your body burns fat to get the energy it needs.  Calorie counting means keeping track of how many calories you eat and drink each day. Calorie counting can be helpful if you need to lose weight. If you eat fewer calories than your body needs, you should lose weight. Ask your health care provider what a healthy weight is for you.  For calorie counting to work, you will need to eat the right number of calories each day to lose a healthy amount of weight per week. A dietitian can help you figure out how many calories you need in a day and will suggest ways to reach your calorie goal.  A healthy amount of weight to lose each week is usually 1-2 lb (0.5-0.9 kg). This usually means that your daily calorie intake should be reduced by 500-750 calories.  Eating 1,200-1,500 calories a day can help most women lose weight.  Eating 1,500-1,800 calories a day can help most men lose weight.  What do I need to know about calorie counting?  Work with your health care provider or dietitian to determine how many calories you should get each day. To meet your daily calorie goal, you will need to:  Find out how many calories are in each food that you would like to eat. Try to do this before you eat.  Decide how much of the food you plan to eat.  Keep a food log. Do this by writing down what you ate and how many calories it had.  To successfully lose weight, it is important to balance calorie counting with a healthy lifestyle that includes regular activity.  Where do I find calorie information?  The number of calories in a food can be found on a Nutrition Facts label. If a food does not have a Nutrition Facts label, try to  look up the calories online or ask your dietitian for help.  Remember that calories are listed per serving. If you choose to have more than one serving of a food, you will have to multiply the calories per serving by the number of servings you plan to eat. For example, the label on a package of bread might say that a serving size is 1 slice and that there are 90 calories in a serving. If you eat 1 slice, you will have eaten 90 calories. If you eat 2 slices, you will have eaten 180 calories.  How do I keep a food log?  After each time that you eat, record the following in your food log as soon as possible:  What you ate. Be sure to include toppings, sauces, and other extras on the food.  How much you ate. This can be measured in cups, ounces, or number of items.  How many calories were in each food and drink.  The total number of calories in the food you ate.  Keep your food log near you, such as in a pocket-sized notebook or on an natasha or website on your mobile phone. Some programs will calculate calories for you and show you how many calories you have left to meet your daily goal.  What are some portion-control tips?  Know how many calories are in a serving. This will help you know how many servings you can have of a certain food.  Use a measuring cup to measure serving sizes. You could also try weighing out portions on a kitchen scale. With time, you will be able to estimate serving sizes for some foods.  Take time to put servings of different foods on your favorite plates or in your favorite bowls and cups so you know what a serving looks like.  Try not to eat straight from a food's packaging, such as from a bag or box. Eating straight from the package makes it hard to see how much you are eating and can lead to overeating. Put the amount you would like to eat in a cup or on a plate to make sure you are eating the right portion.  Use smaller plates, glasses, and bowls for smaller portions and to prevent  overeating.  Try not to multitask. For example, avoid watching TV or using your computer while eating. If it is time to eat, sit down at a table and enjoy your food. This will help you recognize when you are full. It will also help you be more mindful of what and how much you are eating.  What are tips for following this plan?  Reading food labels  Check the calorie count compared with the serving size. The serving size may be smaller than what you are used to eating.  Check the source of the calories. Try to choose foods that are high in protein, fiber, and vitamins, and low in saturated fat, trans fat, and sodium.  Shopping  Read nutrition labels while you shop. This will help you make healthy decisions about which foods to buy.  Pay attention to nutrition labels for low-fat or fat-free foods. These foods sometimes have the same number of calories or more calories than the full-fat versions. They also often have added sugar, starch, or salt to make up for flavor that was removed with the fat.  Make a grocery list of lower-calorie foods and stick to it.  Cooking  Try to cook your favorite foods in a healthier way. For example, try baking instead of frying.  Use low-fat dairy products.  Meal planning  Use more fruits and vegetables. One-half of your plate should be fruits and vegetables.  Include lean proteins, such as chicken, turkey, and fish.  Lifestyle  Each week, aim to do one of the followin minutes of moderate exercise, such as walking.  75 minutes of vigorous exercise, such as running.  General information  Know how many calories are in the foods you eat most often. This will help you calculate calorie counts faster.  Find a way of tracking calories that works for you. Get creative. Try different apps or programs if writing down calories does not work for you.  What foods should I eat?    Eat nutritious foods. It is better to have a nutritious, high-calorie food, such as an avocado, than a food with  few nutrients, such as a bag of potato chips.  Use your calories on foods and drinks that will fill you up and will not leave you hungry soon after eating.  Examples of foods that fill you up are nuts and nut butters, vegetables, lean proteins, and high-fiber foods such as whole grains. High-fiber foods are foods with more than 5 g of fiber per serving.  Pay attention to calories in drinks. Low-calorie drinks include water and unsweetened drinks.  The items listed above may not be a complete list of foods and beverages you can eat. Contact a dietitian for more information.  What foods should I limit?  Limit foods or drinks that are not good sources of vitamins, minerals, or protein or that are high in unhealthy fats. These include:  Candy.  Other sweets.  Sodas, specialty coffee drinks, alcohol, and juice.  The items listed above may not be a complete list of foods and beverages you should avoid. Contact a dietitian for more information.  How do I count calories when eating out?  Pay attention to portions. Often, portions are much larger when eating out. Try these tips to keep portions smaller:  Consider sharing a meal instead of getting your own.  If you get your own meal, eat only half of it. Before you start eating, ask for a container and put half of your meal into it.  When available, consider ordering smaller portions from the menu instead of full portions.  Pay attention to your food and drink choices. Knowing the way food is cooked and what is included with the meal can help you eat fewer calories.  If calories are listed on the menu, choose the lower-calorie options.  Choose dishes that include vegetables, fruits, whole grains, low-fat dairy products, and lean proteins.  Choose items that are boiled, broiled, grilled, or steamed. Avoid items that are buttered, battered, fried, or served with cream sauce. Items labeled as crispy are usually fried, unless stated otherwise.  Choose water, low-fat milk,  unsweetened iced tea, or other drinks without added sugar. If you want an alcoholic beverage, choose a lower-calorie option, such as a glass of wine or light beer.  Ask for dressings, sauces, and syrups on the side. These are usually high in calories, so you should limit the amount you eat.  If you want a salad, choose a garden salad and ask for grilled meats. Avoid extra toppings such as kulkarni, cheese, or fried items. Ask for the dressing on the side, or ask for olive oil and vinegar or lemon to use as dressing.  Estimate how many servings of a food you are given. Knowing serving sizes will help you be aware of how much food you are eating at restaurants.  Where to find more information  Centers for Disease Control and Prevention: www.cdc.gov  [place] Department of Agriculture: myplate.gov  Summary  Calorie counting means keeping track of how many calories you eat and drink each day. If you eat fewer calories than your body needs, you should lose weight.  A healthy amount of weight to lose per week is usually 1-2 lb (0.5-0.9 kg). This usually means reducing your daily calorie intake by 500-750 calories.  The number of calories in a food can be found on a Nutrition Facts label. If a food does not have a Nutrition Facts label, try to look up the calories online or ask your dietitian for help.  Use smaller plates, glasses, and bowls for smaller portions and to prevent overeating.  Use your calories on foods and drinks that will fill you up and not leave you hungry shortly after a meal.  This information is not intended to replace advice given to you by your health care provider. Make sure you discuss any questions you have with your health care provider.  Document Revised: 01/28/2021 Document Reviewed: 01/28/2021  Elsevier Patient Education © 2022 [employer]

## 2023-01-16 ENCOUNTER — PRIOR AUTHORIZATION (OUTPATIENT)
Dept: FAMILY MEDICINE CLINIC | Facility: CLINIC | Age: 23
End: 2023-01-16
Payer: COMMERCIAL

## 2023-01-17 ENCOUNTER — TELEPHONE (OUTPATIENT)
Dept: FAMILY MEDICINE CLINIC | Facility: CLINIC | Age: 23
End: 2023-01-17
Payer: COMMERCIAL

## 2023-01-17 DIAGNOSIS — E66.01 MORBID OBESITY: ICD-10-CM

## 2023-01-17 DIAGNOSIS — E11.9 TYPE 2 DIABETES MELLITUS WITHOUT COMPLICATION, WITHOUT LONG-TERM CURRENT USE OF INSULIN: Primary | ICD-10-CM

## 2023-01-17 NOTE — TELEPHONE ENCOUNTER
KENDRA SHIRA (Key: [id number]) - [id number]  Mounjaro 5MG/0.5ML pen-injectors       Status: PA Response - DeniedThe member has to try and fail Sue Barajas, Haleigh Hawkins Victoza

## 2023-01-17 NOTE — TELEPHONE ENCOUNTER
I notified the patient that his insurance has denied Mounjaro. Victoza is on his formulary so this is what will be prescribed and patient is agreeable. TP

## 2023-04-26 DIAGNOSIS — I10 ESSENTIAL HYPERTENSION: Chronic | ICD-10-CM

## 2023-04-27 DIAGNOSIS — I10 ESSENTIAL HYPERTENSION: Chronic | ICD-10-CM

## 2023-04-27 RX ORDER — LISINOPRIL AND HYDROCHLOROTHIAZIDE 20; 12.5 MG/1; MG/1
1 TABLET ORAL 2 TIMES DAILY
Qty: 60 TABLET | Refills: 11 | OUTPATIENT
Start: 2023-04-27

## 2023-04-27 RX ORDER — LISINOPRIL AND HYDROCHLOROTHIAZIDE 20; 12.5 MG/1; MG/1
1 TABLET ORAL 2 TIMES DAILY
Qty: 60 TABLET | Refills: 0 | Status: SHIPPED | OUTPATIENT
Start: 2023-04-27 | End: 2023-04-27 | Stop reason: SDUPTHER

## 2023-04-27 RX ORDER — LISINOPRIL AND HYDROCHLOROTHIAZIDE 20; 12.5 MG/1; MG/1
1 TABLET ORAL 2 TIMES DAILY
Qty: 60 TABLET | Refills: 0 | Status: SHIPPED | OUTPATIENT
Start: 2023-04-27

## 2023-04-27 NOTE — TELEPHONE ENCOUNTER
Requested Prescriptions     Refused Prescriptions Disp Refills   • lisinopril-hydrochlorothiazide (PRINZIDE,ZESTORETIC) 20-12.5 MG per tablet [Pharmacy Med Name: LISINOP/HCTZ 20-12.5 TAB 20-12.5 Tablet] 60 tablet 11     Sig: Take 1 tablet by mouth 2 (Two) Times a Day.     Refused By: PETER MAZA     Reason for Refusal: Patient no longer under prescriber care

## 2023-04-27 NOTE — TELEPHONE ENCOUNTER
Morenita Ruvalcaba RN  You 1 hour ago (9:14 AM)  QS  Refill denied due to being under Dr. Boateng's name to prevent error in represcribing. If appr please start new encounter under PCP to refill.     lisinopril-hydrochlorothiazide (PRINZIDE,ZESTORETIC) 20-12.5 MG per tablet

## 2023-05-08 DIAGNOSIS — F41.1 GENERALIZED ANXIETY DISORDER: Chronic | ICD-10-CM

## 2023-05-08 DIAGNOSIS — F33.42 RECURRENT MAJOR DEPRESSIVE DISORDER, IN FULL REMISSION: Chronic | ICD-10-CM

## 2023-05-08 RX ORDER — ESCITALOPRAM OXALATE 10 MG/1
10 TABLET ORAL DAILY
Qty: 30 TABLET | Refills: 11 | OUTPATIENT
Start: 2023-05-08

## 2023-05-10 DIAGNOSIS — F41.1 GENERALIZED ANXIETY DISORDER: Chronic | ICD-10-CM

## 2023-05-10 DIAGNOSIS — F33.42 RECURRENT MAJOR DEPRESSIVE DISORDER, IN FULL REMISSION: Chronic | ICD-10-CM

## 2023-05-11 ENCOUNTER — LAB (OUTPATIENT)
Dept: LAB | Facility: OTHER | Age: 23
End: 2023-05-11
Payer: COMMERCIAL

## 2023-05-11 DIAGNOSIS — E11.9 TYPE 2 DIABETES MELLITUS WITHOUT COMPLICATION, WITHOUT LONG-TERM CURRENT USE OF INSULIN: Chronic | ICD-10-CM

## 2023-05-11 DIAGNOSIS — E78.2 MIXED HYPERLIPIDEMIA: Chronic | ICD-10-CM

## 2023-05-11 DIAGNOSIS — E78.1 HYPERTRIGLYCERIDEMIA: Chronic | ICD-10-CM

## 2023-05-11 DIAGNOSIS — F41.1 GENERALIZED ANXIETY DISORDER: Chronic | ICD-10-CM

## 2023-05-11 DIAGNOSIS — F33.42 RECURRENT MAJOR DEPRESSIVE DISORDER, IN FULL REMISSION: Chronic | ICD-10-CM

## 2023-05-11 DIAGNOSIS — E66.01 MORBID OBESITY: Chronic | ICD-10-CM

## 2023-05-11 DIAGNOSIS — I10 ESSENTIAL HYPERTENSION: Chronic | ICD-10-CM

## 2023-05-11 LAB
ALBUMIN SERPL-MCNC: 4.6 G/DL (ref 3.5–5)
ALBUMIN/GLOB SERPL: 1.4 G/DL (ref 1.1–1.8)
ALP SERPL-CCNC: 74 U/L (ref 38–126)
ALT SERPL W P-5'-P-CCNC: 62 U/L
ANION GAP SERPL CALCULATED.3IONS-SCNC: 12 MMOL/L (ref 5–15)
AST SERPL-CCNC: 35 U/L (ref 17–59)
BILIRUB SERPL-MCNC: 0.7 MG/DL (ref 0.2–1.3)
BUN SERPL-MCNC: 11 MG/DL (ref 7–23)
BUN/CREAT SERPL: 16.4 (ref 7–25)
CALCIUM SPEC-SCNC: 9.3 MG/DL (ref 8.4–10.2)
CHLORIDE SERPL-SCNC: 102 MMOL/L (ref 101–112)
CHOLEST SERPL-MCNC: 220 MG/DL (ref 150–200)
CO2 SERPL-SCNC: 22 MMOL/L (ref 22–30)
CREAT SERPL-MCNC: 0.67 MG/DL (ref 0.7–1.3)
EGFRCR SERPLBLD CKD-EPI 2021: 136.2 ML/MIN/1.73
GLOBULIN UR ELPH-MCNC: 3.4 GM/DL (ref 2.3–3.5)
GLUCOSE SERPL-MCNC: 150 MG/DL (ref 70–99)
HBA1C MFR BLD: 6.1 % (ref 4.8–5.6)
HDLC SERPL-MCNC: 31 MG/DL (ref 40–59)
LDLC SERPL CALC-MCNC: 88 MG/DL
LDLC/HDLC SERPL: 2.15 {RATIO} (ref 0–3.55)
POTASSIUM SERPL-SCNC: 4.2 MMOL/L (ref 3.4–5)
PROT SERPL-MCNC: 8 G/DL (ref 6.3–8.6)
SODIUM SERPL-SCNC: 136 MMOL/L (ref 137–145)
TRIGL SERPL-MCNC: 612 MG/DL
VIT B12 BLD-MCNC: 546 PG/ML (ref 211–946)
VLDLC SERPL-MCNC: 101 MG/DL (ref 5–40)

## 2023-05-11 PROCEDURE — 80061 LIPID PANEL: CPT | Performed by: INTERNAL MEDICINE

## 2023-05-11 PROCEDURE — 80053 COMPREHEN METABOLIC PANEL: CPT | Performed by: INTERNAL MEDICINE

## 2023-05-11 PROCEDURE — 83036 HEMOGLOBIN GLYCOSYLATED A1C: CPT | Performed by: INTERNAL MEDICINE

## 2023-05-11 PROCEDURE — 82607 VITAMIN B-12: CPT | Performed by: INTERNAL MEDICINE

## 2023-05-11 RX ORDER — ESCITALOPRAM OXALATE 10 MG/1
10 TABLET ORAL DAILY
Qty: 30 TABLET | Refills: 11 | OUTPATIENT
Start: 2023-05-11

## 2023-05-11 RX ORDER — ESCITALOPRAM OXALATE 10 MG/1
10 TABLET ORAL DAILY
Qty: 30 TABLET | Refills: 11 | Status: SHIPPED | OUTPATIENT
Start: 2023-05-11

## 2023-05-11 NOTE — TELEPHONE ENCOUNTER
Morenita Ruvalcaba RN  You (7:03 AM)  QS  Refill denied due to being under Dr. Boateng's name to prevent error in represcribing. If appr please start new encounter under PCP to refill.       escitalopram (LEXAPRO) 10 MG tablet   Perico asked to have Dr Olivia's nurse call him about [facility] refills.  He has a new insurance, [employer].

## 2023-05-11 NOTE — TELEPHONE ENCOUNTER
Requested Prescriptions     Refused Prescriptions Disp Refills   • escitalopram (LEXAPRO) 10 MG tablet [Pharmacy Med Name: ESCITALOPRAM 10 MG TAB 10 Tablet] 30 tablet 11     Sig: TAKE 1 TABLET BY MOUTH DAILY     Refused By: PETER MAZA     Reason for Refusal: Patient no longer under prescriber care

## 2023-05-15 ENCOUNTER — OFFICE VISIT (OUTPATIENT)
Dept: FAMILY MEDICINE CLINIC | Facility: CLINIC | Age: 23
End: 2023-05-15
Payer: COMMERCIAL

## 2023-05-15 VITALS
BODY MASS INDEX: 42.66 KG/M2 | TEMPERATURE: 96 F | SYSTOLIC BLOOD PRESSURE: 134 MMHG | HEIGHT: 72 IN | OXYGEN SATURATION: 98 % | DIASTOLIC BLOOD PRESSURE: 84 MMHG | HEART RATE: 95 BPM | WEIGHT: 315 LBS

## 2023-05-15 DIAGNOSIS — E13.9 MODY (MATURITY ONSET DIABETES MELLITUS IN YOUNG): Chronic | ICD-10-CM

## 2023-05-15 DIAGNOSIS — E11.9 TYPE 2 DIABETES MELLITUS WITHOUT COMPLICATION, WITHOUT LONG-TERM CURRENT USE OF INSULIN: ICD-10-CM

## 2023-05-15 DIAGNOSIS — K76.0 FATTY INFILTRATION OF LIVER: Chronic | ICD-10-CM

## 2023-05-15 DIAGNOSIS — E66.01 MORBID OBESITY: ICD-10-CM

## 2023-05-15 DIAGNOSIS — I10 ESSENTIAL HYPERTENSION: Primary | Chronic | ICD-10-CM

## 2023-05-15 DIAGNOSIS — E78.1 HYPERTRIGLYCERIDEMIA: Chronic | ICD-10-CM

## 2023-05-15 DIAGNOSIS — E78.2 MIXED HYPERLIPIDEMIA: Chronic | ICD-10-CM

## 2023-05-15 PROBLEM — Z72.0 TOBACCO ABUSE: Chronic | Status: ACTIVE | Noted: 2023-05-15

## 2023-05-15 NOTE — PROGRESS NOTES
"Chief Complaint  Follow-up (4 month)    Subjective        History of Present Illness     Forest David presents to the office for routine follow up on chronic medical issues include Type 2 diabetes diagnosed at age 21, hyperlipidemia addressed with Crestor 10 mg nightly, and hypertension managed with lisinopril HCT 20/12.5 mg twice daily. He takes Lexapro 10 mg daily for CLEMENTINE/depression.   He has a diagnosis of fatty infiltration of the liver documented on liver ultrasound in 2016.  ALT a little lower at 62.  Denies alcohol use.      Patient was started on metformin with his diagnosis of Type 2 diabetes 05/2021.  With his visit last fall, we provided a sample of Mounjaro 2.5 mg.  He tolerated well, but his pharmacy denied weekly Mounjaro and we started him on daily Victoza.  He feels the Victoza helps with appetite suppression, although, his weight is actually up 9 pounds since his visit last fall.  He was unaware his weight was trending up.  He admits he missed 2 weeks of daily Victoza recently due to forgetting the medication when packing. Despite weight gain, diabetes has improved with A1c 6.1 down from 6.7.     Patient admits to smoking cigarettes as well as use of chewing tobacco.  I advised the patient of the risks in continuing to use tobacco products.  Patient is urged to stop us of tobacco products.       Lab results are reviewed with the patient today.  Fasting glucose 150.  Normal renal function.  Sodium slightly below goal at 136. His cholesterol is significantly higher and triglycerides vastly above goal at 612. However, as above, he missed 2 weeks of all medications including Crestor.        Objective   Vital Signs:  /84   Pulse 95   Temp 96 °F (35.6 °C)   Ht 182.9 cm (72\")   Wt (!) 145 kg (319 lb)   SpO2 98%   BMI 43.26 kg/m²   Estimated body mass index is 43.26 kg/m² as calculated from the following:    Height as of this encounter: 182.9 cm (72\").    Weight as of this encounter: 145 kg " (319 lb).             Physical Exam  Vitals reviewed.   Constitutional:       General: He is not in acute distress.     Appearance: He is well-developed.      Comments: Pleasant male, morbidly obese.     HENT:      Head: Normocephalic and atraumatic.      Nose:      Right Sinus: No maxillary sinus tenderness or frontal sinus tenderness.      Left Sinus: No maxillary sinus tenderness or frontal sinus tenderness.      Mouth/Throat:      Mouth: No oral lesions.      Pharynx: Uvula midline.      Tonsils: No tonsillar exudate.   Eyes:      Conjunctiva/sclera: Conjunctivae normal.      Pupils: Pupils are equal, round, and reactive to light.   Neck:      Thyroid: No thyroid mass or thyromegaly.      Vascular: No carotid bruit or JVD.      Trachea: Trachea normal. No tracheal deviation.   Cardiovascular:      Rate and Rhythm: Normal rate and regular rhythm.  No extrasystoles are present.     Chest Wall: PMI is not displaced.      Heart sounds: Normal heart sounds. No murmur heard.  Pulmonary:      Effort: Pulmonary effort is normal. No accessory muscle usage or respiratory distress.      Breath sounds: Normal breath sounds. No decreased breath sounds, wheezing, rhonchi or rales.   Abdominal:      General: Bowel sounds are normal. There is no distension.      Palpations: Abdomen is soft.      Tenderness: There is no abdominal tenderness.      Comments: Obese abdomen.    Musculoskeletal:      Cervical back: Neck supple.   Lymphadenopathy:      Cervical: No cervical adenopathy.   Skin:     General: Skin is warm and dry.      Findings: No rash.      Nails: There is no clubbing.   Neurological:      Mental Status: He is alert and oriented to person, place, and time.      Cranial Nerves: No cranial nerve deficit.      Coordination: Coordination normal.   Psychiatric:         Speech: Speech normal.         Behavior: Behavior normal.         Thought Content: Thought content normal.         Judgment: Judgment normal.             Result Review :    CMP        10/25/2022    11:28 5/11/2023    11:26   CMP   Glucose 123   150     BUN 7   11     Creatinine 0.66   0.67     EGFR 137.7   136.2     Sodium 137   136     Potassium 4.3   4.2     Chloride 103   102     Calcium 9.4   9.3     Total Protein 7.6   8.0     Albumin 4.70   4.6     Globulin 2.9   3.4     Total Bilirubin 0.7   0.7     Alkaline Phosphatase 84   74     AST (SGOT) 41   35     ALT (SGPT) 72   62     Albumin/Globulin Ratio 1.6   1.4     BUN/Creatinine Ratio 10.6   16.4     Anion Gap 11.0   12.0       CBC w/diff        10/25/2022    11:28   CBC w/Diff   WBC 9.34     RBC 5.76     Hemoglobin 16.4     Hematocrit 48.0     MCV 83.3     MCH 28.5     MCHC 34.2     RDW 13.2     Platelets 285       Lipid Panel        10/25/2022    11:28 5/11/2023    11:26   Lipid Panel   Total Cholesterol 172   220     Triglycerides 472   612     HDL Cholesterol 31   31     VLDL Cholesterol 73   101     LDL Cholesterol  68   88     LDL/HDL Ratio 1.50   2.15       TSH        10/25/2022    11:28   TSH   TSH 1.830       A1C Last 3 Results        10/25/2022    11:28 5/11/2023    11:26   HGBA1C Last 3 Results   Hemoglobin A1C 6.70   6.10                        Assessment and Plan   Diagnoses and all orders for this visit:    1. Essential hypertension (Primary)  -     Comprehensive Metabolic Panel; Future    2. Type 2 diabetes mellitus without complication, without long-term current use of insulin  -     CBC Auto Differential; Future  -     Comprehensive Metabolic Panel; Future  -     Hemoglobin A1c; Future  -     Microalbumin / Creatinine Urine Ratio - Urine, Clean Catch; Future  -     TSH; Future  -     C-Peptide; Future    3. YANET (maturity onset diabetes mellitus in young)  -     Comprehensive Metabolic Panel; Future  -     Hemoglobin A1c; Future  -     C-Peptide; Future    4. Morbid obesity  -     Comprehensive Metabolic Panel; Future    5. Hypertriglyceridemia  -     Lipid Panel; Future    6. Mixed  hyperlipidemia  -     Lipid Panel; Future    7. Fatty infiltration of liver  -     Comprehensive Metabolic Panel; Future    Other orders  -     Liraglutide (VICTOZA) 18 MG/3ML solution pen-injector injection; Inject 1.2 mg under the skin into the appropriate area as directed Daily.  Dispense: 6 mL; Refill: 6                Continue metformin  mg b.i.d. and weekly Victoza, although, we will have him gradually increase his dose of Victoza from 0.6 mg daily to 1.2 mg daily to help patient with appetite suppression to to help achieve weight loss goals. Prescription sent for higher dose. Pursue  efforts at aggressive diabetic diet, exercise, weight loss plan which I have reviewed again today. Encouraged patient to purchase scales and weigh weekly with a weight loss goal of 5 pounds monthly.  Notify us if not meeting weight loss goals and we will increase his dose of Victoza.  [place] exercise [place] in different times of the year including planting and harvest, although, I did encourage him to consider a gym membership.   Weight loss will also help with fatty liver.         Continue Crestor for hyperlipidemia and rather severe high triglycerides. His numbers are quite a bit higher, although, he admits to missing 2 weeks of Crestor prior to labs.   Aggressive weight loss should dramatically improve triglycerides.  If this is not accomplished, we will need to add Vascepa to lower triglycerides.     Continue current hypertension management with lisinopril HCT.  Pursue sodium restriction and aggressive weight loss.  Blood pressure control is improved today.     I advised the patient of the risks in continuing to use tobacco products.  Patient is urged to stop tobacco use.      Continue the Lexapro for CLEMENTINE/depression, stable.      Return in 6 months for routine follow up with fasting labs one week prior or sooner if needed.      Scribed for Dr. Gibbons by Arpita Pagan [facility].     Follow Up   Return in about  6 months (around 11/15/2023) for Follow up in six months with labs one week prior..  Patient was given instructions and counseling regarding his condition or for health maintenance advice. Please see specific information pulled into the AVS if appropriate.

## 2023-08-09 DIAGNOSIS — I10 ESSENTIAL HYPERTENSION: Chronic | ICD-10-CM

## 2023-08-09 RX ORDER — LISINOPRIL AND HYDROCHLOROTHIAZIDE 20; 12.5 MG/1; MG/1
1 TABLET ORAL 2 TIMES DAILY
Qty: 60 TABLET | Refills: 11 | Status: SHIPPED | OUTPATIENT
Start: 2023-08-09